# Patient Record
Sex: MALE | Race: WHITE | Employment: OTHER | URBAN - METROPOLITAN AREA
[De-identification: names, ages, dates, MRNs, and addresses within clinical notes are randomized per-mention and may not be internally consistent; named-entity substitution may affect disease eponyms.]

---

## 2022-10-28 ENCOUNTER — HOSPITAL ENCOUNTER (OUTPATIENT)
Dept: CT IMAGING | Age: 77
Discharge: HOME | End: 2022-10-28
Attending: PHYSICIAN ASSISTANT
Payer: MEDICARE

## 2022-10-28 DIAGNOSIS — N28.9 RENAL LESION: ICD-10-CM

## 2022-10-28 DIAGNOSIS — R31.0 GROSS HEMATURIA: ICD-10-CM

## 2022-10-28 LAB — CREAT UR-MCNC: 1.1 MG/DL (ref 0.6–1.3)

## 2022-10-28 PROCEDURE — 74178 CT ABD&PLV WO CNTR FLWD CNTR: CPT

## 2022-10-28 PROCEDURE — 82565 ASSAY OF CREATININE: CPT

## 2022-10-28 PROCEDURE — 74011000636 HC RX REV CODE- 636: Performed by: PHYSICIAN ASSISTANT

## 2022-10-28 RX ADMIN — IOPAMIDOL 100 ML: 612 INJECTION, SOLUTION INTRAVENOUS at 17:18

## 2023-10-17 ENCOUNTER — OFFICE VISIT (OUTPATIENT)
Age: 78
End: 2023-10-17

## 2023-10-17 VITALS — BODY MASS INDEX: 24.87 KG/M2 | WEIGHT: 193.8 LBS | HEIGHT: 74 IN

## 2023-10-17 DIAGNOSIS — M17.12 PRIMARY OSTEOARTHRITIS OF LEFT KNEE: ICD-10-CM

## 2023-10-17 DIAGNOSIS — S83.92XA SPRAIN OF LEFT KNEE, UNSPECIFIED LIGAMENT, INITIAL ENCOUNTER: ICD-10-CM

## 2023-10-17 DIAGNOSIS — M25.562 LEFT KNEE PAIN, UNSPECIFIED CHRONICITY: Primary | ICD-10-CM

## 2023-10-17 RX ORDER — TRIAMCINOLONE ACETONIDE 40 MG/ML
40 INJECTION, SUSPENSION INTRA-ARTICULAR; INTRAMUSCULAR ONCE
Status: COMPLETED | OUTPATIENT
Start: 2023-10-17 | End: 2023-10-17

## 2023-10-17 RX ADMIN — TRIAMCINOLONE ACETONIDE 40 MG: 40 INJECTION, SUSPENSION INTRA-ARTICULAR; INTRAMUSCULAR at 13:30

## 2023-10-17 NOTE — PROGRESS NOTES
JOINT/BURSA     triamcinolone acetonide (KENALOG-40) injection 40 mg      2. Primary osteoarthritis of left knee  M17.12       3. Sprain of left knee, unspecified ligament, initial encounter  S83. 92XA         PLAN:   1. Patient presented with left knee pain and swelling due to primary OA, due to exacerbation secondary to a fall. Given the significant amount of swelling aspirated 30 cc of yellow fluid injected Kenalog. Instructions given for physician directed home exercise program for range of motion and decreased level of activity. Is to return in 2 weeks. Risk factors include: N/A  2. Yes cortisone injection indicated today  3. No Physical/Occupational Therapy indicated today  4. No diagnostic test indicated today   5. No durable medical equipment indicated today  6. No referral indicated today   7. No medications indicated today:   8. No Narcotic indicated today    RTC 2 weeks if pain continues     [facility]) as dictated by Melissa Smith MD    I, Dr. Melissa Smith, confirm that all documentation is accurate.     Melissa Smith M.D.   [facility]

## 2023-10-31 ENCOUNTER — OFFICE VISIT (OUTPATIENT)
Age: 78
End: 2023-10-31
Payer: MEDICARE

## 2023-10-31 DIAGNOSIS — M17.12 PRIMARY OSTEOARTHRITIS OF LEFT KNEE: Primary | ICD-10-CM

## 2023-10-31 PROCEDURE — G8484 FLU IMMUNIZE NO ADMIN: HCPCS | Performed by: ORTHOPAEDIC SURGERY

## 2023-10-31 PROCEDURE — 99212 OFFICE O/P EST SF 10 MIN: CPT | Performed by: ORTHOPAEDIC SURGERY

## 2023-10-31 PROCEDURE — 1123F ACP DISCUSS/DSCN MKR DOCD: CPT | Performed by: ORTHOPAEDIC SURGERY

## 2023-10-31 PROCEDURE — G8427 DOCREV CUR MEDS BY ELIG CLIN: HCPCS | Performed by: ORTHOPAEDIC SURGERY

## 2023-10-31 PROCEDURE — 1036F TOBACCO NON-USER: CPT | Performed by: ORTHOPAEDIC SURGERY

## 2023-10-31 PROCEDURE — G8420 CALC BMI NORM PARAMETERS: HCPCS | Performed by: ORTHOPAEDIC SURGERY

## 2023-10-31 NOTE — PROGRESS NOTES
Juan Adhikari  [birth year]   Chief Complaint   Patient presents with    Knee Pain     Lt         HISTORY OF PRESENT ILLNESS  Juan Adhikari is a 77 y.o. male who presents today for reevaluation of left knee pain. Pain is a 1/10. Pain has been present since 10/13/23 following an injury. He tripped and fell down three steps. He continues with his daily activities paying little attention to his knee. The following day, he noticed pain and swelling. He has been utilizing ice compresses with moderate relief. Having difficulty walking to the point that he limps and has difficulty managing any distance. At [address] [place] [address] on 10/17/2023, patient received a left knee aspiration and cortisone injection which provided significant relief. Pt continues staying active by cycling which also helps relieve the pain. Patient denies any fever, chills, chest pain, shortness of breath or calf pain. The remainder of the review of systems is negative. There are no new illness or injuries to report since last seen in the office. No changes in medications, allergies, social or family history. PHYSICAL EXAM:   There were no vitals taken for this visit. The patient is a well-developed, well-nourished male   in no acute distress. The patient is alert and oriented times three. The patient is alert and oriented times three. Mood and affect are normal.  LYMPHATIC: lymph nodes are not enlarged and are within normal limits  SKIN: normal in color and non tender to palpation. There are no bruises or abrasions noted. NEUROLOGICAL: Motor sensory exam is within normal limits. Reflexes are equal bilaterally.  There is normal sensation to pinprick and light touch  MUSCULOSKELETAL:  Examination Left knee Right knee   Skin Intact Intact   Range of motion 0- 0-130   Effusion - -   Medial joint line tenderness + -   Lateral joint line tenderness - -   Tenderness Pes Bursa - -   Tenderness insertion MCL - -   Tenderness insertion LCL - -

## 2024-03-19 ENCOUNTER — OFFICE VISIT (OUTPATIENT)
Age: 79
End: 2024-03-19
Payer: COMMERCIAL

## 2024-03-19 VITALS — WEIGHT: 203 LBS | HEIGHT: 74 IN | BODY MASS INDEX: 26.05 KG/M2

## 2024-03-19 DIAGNOSIS — M70.31 BURSITIS OF RIGHT ELBOW, UNSPECIFIED BURSA: ICD-10-CM

## 2024-03-19 DIAGNOSIS — M25.521 RIGHT ELBOW PAIN: Primary | ICD-10-CM

## 2024-03-19 PROCEDURE — G8427 DOCREV CUR MEDS BY ELIG CLIN: HCPCS | Performed by: ORTHOPAEDIC SURGERY

## 2024-03-19 PROCEDURE — 99214 OFFICE O/P EST MOD 30 MIN: CPT | Performed by: ORTHOPAEDIC SURGERY

## 2024-03-19 PROCEDURE — 1036F TOBACCO NON-USER: CPT | Performed by: ORTHOPAEDIC SURGERY

## 2024-03-19 PROCEDURE — G8484 FLU IMMUNIZE NO ADMIN: HCPCS | Performed by: ORTHOPAEDIC SURGERY

## 2024-03-19 PROCEDURE — 1123F ACP DISCUSS/DSCN MKR DOCD: CPT | Performed by: ORTHOPAEDIC SURGERY

## 2024-03-19 PROCEDURE — G8419 CALC BMI OUT NRM PARAM NOF/U: HCPCS | Performed by: ORTHOPAEDIC SURGERY

## 2024-03-19 RX ORDER — MELOXICAM 7.5 MG/1
7.5 TABLET ORAL DAILY
Qty: 30 TABLET | Refills: 3 | Status: SHIPPED | OUTPATIENT
Start: 2024-03-19

## 2024-03-19 NOTE — PROGRESS NOTES
Michael Haile  1945   Chief Complaint   Patient presents with    Injury     Right elbow pain        HISTORY OF PRESENT ILLNESS  Michael Haile is a 78 y.o. male who presents today for evaluation of right elbow pain.  Pain is a 8/10. Pain has been present for a couple of weeks. The patient was last seen by me in office on 10/31/2023 for reevaluation of left knee pain. The patient notes that he  fell a couple of weeks ago. The patient describes swelling in his right elbow. He notes pain with movements and he states that it is sensitive to touch. The patient denies pain while at rest.     Has tried following treatments: Injections:No; Brace:No; Therapy:No; Cane/Crutch:No      Allergies   Allergen Reactions    Lactose      Other reaction(s): other/intolerance    Piper      Other reaction(s): nasal symptoms        Past Medical History:   Diagnosis Date    Arthritis     Depression 01/01/1995    Diverticulosis     Dyslipidemia     Hypovitaminosis D     Malignant melanoma nos 01/01/2007    Melanoma (HCC)     Right bundle branch block     chronic    Right shoulder pain     Tinea cruris     recurrent    Tinnitus 01/01/2000    Wears glasses       Social History       Tobacco History       Smoking Status  Former Smoking Start Date  10/23/1964 Quit Date  10/23/1976 Smoking Tobacco Type  Cigarettes from 10/23/1964 to 10/23/1976   Pack Year History     Packs/Day From To Years    0 10/23/1976  47.4     10/23/1964 10/23/1976 12.0      Smokeless Tobacco Use  Never              Alcohol History       Alcohol Use Status  Yes              Drug Use       Drug Use Status  No              Sexual Activity       Sexually Active  Not Asked                   Past Surgical History:   Procedure Laterality Date    HERNIA REPAIR      OTHER SURGICAL HISTORY      Melanoma excision - Ear    TONSILLECTOMY AND ADENOIDECTOMY        Family History   Problem Relation Age of Onset    Stroke Mother     Osteoarthritis Other

## 2024-04-23 ENCOUNTER — TELEPHONE (OUTPATIENT)
Age: 79
End: 2024-04-23

## 2024-04-23 NOTE — TELEPHONE ENCOUNTER
Patients wife called on behalf of patient to report that his elbow pain is gone but he is experiencing increased swelling/fluid in the elbow.  A follow up visit was scheduled for 4/25, however, they will cancel if we feel the fluid may go away on it's own.  Please advise patient or Sanjuana at [phone] or [phone].

## 2024-04-25 ENCOUNTER — OFFICE VISIT (OUTPATIENT)
Age: 79
End: 2024-04-25
Payer: COMMERCIAL

## 2024-04-25 VITALS — BODY MASS INDEX: 26.05 KG/M2 | WEIGHT: 203 LBS | HEIGHT: 74 IN

## 2024-04-25 DIAGNOSIS — M70.31 BURSITIS OF RIGHT ELBOW, UNSPECIFIED BURSA: ICD-10-CM

## 2024-04-25 DIAGNOSIS — M25.521 RIGHT ELBOW PAIN: Primary | ICD-10-CM

## 2024-04-25 PROCEDURE — G8427 DOCREV CUR MEDS BY ELIG CLIN: HCPCS | Performed by: ORTHOPAEDIC SURGERY

## 2024-04-25 PROCEDURE — 1123F ACP DISCUSS/DSCN MKR DOCD: CPT | Performed by: ORTHOPAEDIC SURGERY

## 2024-04-25 PROCEDURE — 99212 OFFICE O/P EST SF 10 MIN: CPT | Performed by: ORTHOPAEDIC SURGERY

## 2024-04-25 PROCEDURE — G8419 CALC BMI OUT NRM PARAM NOF/U: HCPCS | Performed by: ORTHOPAEDIC SURGERY

## 2024-04-25 PROCEDURE — 1036F TOBACCO NON-USER: CPT | Performed by: ORTHOPAEDIC SURGERY

## 2024-04-25 NOTE — PROGRESS NOTES
Michael Haile  1945   Chief Complaint   Patient presents with    Elbow Injury     rt        HISTORY OF PRESENT ILLNESS  Michael Haile is a 78 y.o. male who presents today for reevaluation of right elbow. Pain is a 0/10. The patient notes swelling in the right elbow, but denies pain. The patient indicates that he is not experiencing discomfort in his right elbow.     Patient denies any fever, chills, chest pain, shortness of breath or calf pain. The remainder of the review of systems is negative. There are no new illness or injuries to report since last seen in the office. No changes in medications, allergies, social or family history.      PHYSICAL EXAM:   Ht 1.88 m (6' 2\")   Wt 92.1 kg (203 lb)   BMI 26.06 kg/m²   The patient is a well-developed, well-nourished male   in no acute distress.  The patient is alert and oriented times three.  The patient is alert and oriented times three. Mood and affect are normal.  LYMPHATIC: lymph nodes are not enlarged and are within normal limits  SKIN: normal in color and non tender to palpation. There are no bruises or abrasions noted.   NEUROLOGICAL: Motor sensory exam is within normal limits. Reflexes are equal bilaterally. There is normal sensation to pinprick and light touch  MUSCULOSKELETAL:  Examination Left Elbow Right Elbow   Skin Intact Intact   Range of Motion 0-135 0-135   Tenderness Medial Epicondyle - -   Tenderness Lateral Epicondyle - -   Tenderness Olecranon Bursa - -   Swelling - Mild swelling of the olecranon area   Bruising - -   Stability Normal Normal   Motor Strength  Normal Normal   Neurovascular Intact Intact       IMAGING: XR of the right elbow with 4 views obtained from [facility] dated 03/01/2024 reviewed and read by Dr. Pierson: Small olecranon spur     IMPRESSION:      ICD-10-CM    1. Right elbow pain  M25.521       2. Bursitis of right elbow, unspecified bursa  M70.31            PLAN:   1. Pt presented today with right elbow